# Patient Record
Sex: FEMALE | Race: WHITE | NOT HISPANIC OR LATINO | Employment: OTHER | URBAN - NONMETROPOLITAN AREA
[De-identification: names, ages, dates, MRNs, and addresses within clinical notes are randomized per-mention and may not be internally consistent; named-entity substitution may affect disease eponyms.]

---

## 2021-11-19 ENCOUNTER — OFFICE VISIT (OUTPATIENT)
Dept: CARDIOLOGY CLINIC | Facility: CLINIC | Age: 56
End: 2021-11-19

## 2021-11-19 VITALS
WEIGHT: 103 LBS | HEIGHT: 64 IN | BODY MASS INDEX: 17.58 KG/M2 | HEART RATE: 89 BPM | OXYGEN SATURATION: 97 % | DIASTOLIC BLOOD PRESSURE: 82 MMHG | SYSTOLIC BLOOD PRESSURE: 130 MMHG

## 2021-11-19 DIAGNOSIS — R00.0 TACHYCARDIA: Primary | ICD-10-CM

## 2021-11-19 DIAGNOSIS — G20 PARKINSON DISEASE (HCC): ICD-10-CM

## 2021-11-19 PROCEDURE — 99204 OFFICE O/P NEW MOD 45 MIN: CPT | Performed by: INTERNAL MEDICINE

## 2021-11-19 RX ORDER — IBUPROFEN 400 MG/1
400 TABLET ORAL
COMMUNITY

## 2021-11-21 PROBLEM — G20 PARKINSON DISEASE (HCC): Status: ACTIVE | Noted: 2021-11-21

## 2021-11-21 PROBLEM — R00.0 TACHYCARDIA: Status: ACTIVE | Noted: 2021-11-21

## 2021-11-21 PROBLEM — G20.A1 PARKINSON DISEASE: Status: ACTIVE | Noted: 2021-11-21

## 2021-11-21 PROCEDURE — 93000 ELECTROCARDIOGRAM COMPLETE: CPT | Performed by: INTERNAL MEDICINE

## 2021-12-08 ENCOUNTER — HOSPITAL ENCOUNTER (OUTPATIENT)
Dept: NON INVASIVE DIAGNOSTICS | Facility: HOSPITAL | Age: 56
Discharge: HOME | End: 2021-12-08
Attending: INTERNAL MEDICINE
Payer: COMMERCIAL

## 2021-12-08 VITALS
HEART RATE: 75 BPM | HEIGHT: 64 IN | DIASTOLIC BLOOD PRESSURE: 82 MMHG | WEIGHT: 105 LBS | BODY MASS INDEX: 17.93 KG/M2 | SYSTOLIC BLOOD PRESSURE: 130 MMHG

## 2021-12-08 DIAGNOSIS — R00.0 TACHYCARDIA: ICD-10-CM

## 2021-12-08 DIAGNOSIS — G20 PARKINSON DISEASE (HCC): ICD-10-CM

## 2021-12-08 LAB
AORTIC ROOT: 2.4 CM
APICAL FOUR CHAMBER EJECTION FRACTION: 78 %
E WAVE DECELERATION TIME: 232 MS
FRACTIONAL SHORTENING: 39 % (ref 28–44)
INTERVENTRICULAR SEPTUM IN DIASTOLE (PARASTERNAL SHORT AXIS VIEW): 0.7 CM
LEFT INTERNAL DIMENSION IN SYSTOLE: 2 CM (ref 2.1–4)
LEFT VENTRICULAR INTERNAL DIMENSION IN DIASTOLE: 3.3 CM (ref 3.6–5.36)
LEFT VENTRICULAR POSTERIOR WALL IN END DIASTOLE: 0.7 CM
LEFT VENTRICULAR STROKE VOLUME: 31 ML
MV E'TISSUE VEL-LAT: 14 CM/S
MV E'TISSUE VEL-SEP: 9 CM/S
MV PEAK A VEL: 0.59 M/S
MV PEAK E VEL: 77 CM/S
MV STENOSIS PRESSURE HALF TIME: 0 MS
SL CV LV EF: 70
SL CV PED ECHO LEFT VENTRICLE DIASTOLIC VOLUME (MOD BIPLANE) 2D: 43 ML
SL CV PED ECHO LEFT VENTRICLE SYSTOLIC VOLUME (MOD BIPLANE) 2D: 12 ML
TRICUSPID VALVE S': 0.8 CM/S
Z-SCORE OF LEFT VENTRICULAR DIMENSION IN END SYSTOLE: -2.86

## 2021-12-08 PROCEDURE — 93306 TTE W/DOPPLER COMPLETE: CPT

## 2021-12-08 PROCEDURE — 93306 TTE W/DOPPLER COMPLETE: CPT | Performed by: INTERNAL MEDICINE

## 2021-12-10 ENCOUNTER — TELEPHONE (OUTPATIENT)
Dept: CARDIOLOGY CLINIC | Facility: CLINIC | Age: 56
End: 2021-12-10

## 2022-04-06 ENCOUNTER — EVALUATION (OUTPATIENT)
Dept: PHYSICAL THERAPY | Facility: CLINIC | Age: 57
End: 2022-04-06

## 2022-04-06 DIAGNOSIS — R27.8 LIMB INCOORDINATION: ICD-10-CM

## 2022-04-06 DIAGNOSIS — G20 PRIMARY PARKINSONISM (HCC): Primary | ICD-10-CM

## 2022-04-06 PROCEDURE — 97161 PT EVAL LOW COMPLEX 20 MIN: CPT | Performed by: PHYSICAL THERAPIST

## 2022-04-06 NOTE — PROGRESS NOTES
PT Evaluation     Today's date: 2022  Patient name: Diane Fairbanks  : 1965  MRN: [id number]  Referring provider: Neo Barrios*  Dx:   Encounter Diagnosis     ICD-10-CM    1  Primary parkinsonism ([address] )  G20    2  Limb incoordination  R27 8        Start Time: 1535  Stop Time: 1630  Total time in clinic (min): 55 minutes    Assessment  Assessment details: 61year old female diagnosed with Parkinsons Disease 4 months ago  Pt has had hand sx's of weakness and incoordination for 2 years  Pt very concerned about the progression of weakness and decreasing function in her hands, specifically the dominant R  Increased tone in R UE elbow flx/ext, and wrist/finger flexor groups   testing setting #2: R/43#, L/51#  Significant incoordination R hand compared to L: 9 hole peg: R/38", L/24"  DASH 48% disability  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  She is doing multiple sessions of exercise in various forms daily  She would benefit from a short course of PT at this time to instruct in sensory integration techniques, and strengthening/coordination activity for hands  Patient was issued foam tubing to increase width if  in pens and eating utensils  Impairments: abnormal or restricted ROM, activity intolerance, impaired physical strength, lacks appropriate home exercise program and pain with function  Functional limitations: using utensils; cooking- difficulty chopping, peeling, open packaging; writing; takes a long time to dress  Symptom irritability: moderateUnderstanding of Dx/Px/POC: excellent   Prognosis: fair  Prognosis details: Progressive neurologic disease process    Goals  STG- 3 weeks  22  1  I HEP  2  Improve understanding of sensory integration techniques  3  Review adaptive equipment for improving daily function    LTG- 6 weeks  1  Improve speed of 9 hole peg test R hand by 5-8sec  2   Improve  R hand 5-10# in order to increase daily functional activity level  3  Improve DASH score to under 25% disability    Plan  Patient would benefit from: PT eval, custom splinting and skilled physical therapy  Referral necessary: No  Planned therapy interventions: motor coordination training, neuromuscular re-education, therapeutic activities, therapeutic exercise and home exercise program  Frequency: 1x week  Duration in visits: 6  Duration in weeks: 6  Plan of Care beginning date: 2022  Plan of Care expiration date: 2022  Treatment plan discussed with: patient        Subjective Evaluation    History of Present Illness  Date of onset: 4/15/2020  Mechanism of injury: Pt dx with Parkinsons in   Pt states she has had difficulties with hands for about 2 years  Pt has had CTS ruled out  Pt reports intermittent numbness and paresthesias R hand  Pt reports B neck pain with eating  Notes intention tremor            Recurrent probem    Quality of life: excellent    Pain  Current pain ratin  At best pain ratin  At worst pain ratin  Quality: needle-like  Aggravating factors: eating    Social Support  Steps to enter house: yes  2  Stairs in house: yes   13  Lives in: one-story house  Lives with: young children and spouse    Employment status: working (chiropractor, online business, herbal/natural meds)  Hand dominance: right      Diagnostic Tests  MRI studies: abnormal  CT scan: abnormal  Treatments  Current treatment: physical therapy  Patient Goals  Patient goals for therapy: independence with ADLs/IADLs  Patient goal: exercise; use computer        Objective     Observations     Additional Observation Details  Limited motion of UE's during tranfers and gaiting    Neurological Testing     Additional Neurological Details  Diminished coordination- rapid opposition thumb to finger tips: R/23x, L/28x    Finger to nose intact L/R    + Disdiadokinesia R    Diminished coordination B LE heel to shin L/R    Active Range of Motion Left Shoulder   Flexion: 135 degrees   Abduction: 105 degrees     Right Shoulder   Flexion: 135 degrees   Extension: 135 degrees     Strength/Myotome Testing     Left Shoulder     Planes of Motion   Flexion: 4   Abduction: 5     Right Shoulder     Planes of Motion   Flexion: 4-   Abduction: 5     Left Elbow   Flexion: 5  Extension: 5    Right Elbow   Flexion: 4+  Extension: 4    Left Wrist/Hand   Wrist extension: 5  Wrist flexion: 5     (2nd hand position)     Trial 1: 43    Trial 2: 54    Thumb Strength  Key/Lateral Pinch     Trial 1: 13  Palmar/Three-Point Pinch     Trial 1: 12    Right Wrist/Hand   Wrist extension: 4+  Wrist flexion: 4     (2nd hand position)     Trial 1: 51    Trial 2: 48    Thumb Strength   Key/Lateral Pinch     Trial 1: 16  Palmar/Three-Point Pinch     Trial 1: 12    Tests     Additional Tests Details        Ambulation   Weight-Bearing Status   Weight-Bearing Status (Left): full weight bearing   Weight-Bearing Status (Right): full weight-bearing    Assistive device used: none    Additional Weight-Bearing Status Details  No shoulder motion or arm swing with ambulation    Ambulation: Level Surfaces   Ambulation without assistive device: independent    Ambulation: Stairs   Ascend stairs: independent  Pattern: reciprocal  Railings: one rail  Descend stairs: independent  Pattern: reciprocal  Railings: one rail    General Comments:      Wrist/Hand Comments  DASH -48% disability  Neuro Exam:     Sensation   Light touch LE: left WNL and right WNL    Functional outcomes   5x sit to stand: 14 (seconds)  TU (seconds)  Left 9 peg hole test: 24 (seconds)  Right 9 hole peg test: 37 (seconds)  Functional outcome comment: FGA  24/30      Balance assessments   MCTSIB   Eyes open level surface: 30  Eyes open foam surface: 30  Eyes closed level surface: 30  Eyes closed foam surface: 30 severe sway      Flowsheet Rows    Flowsheet Row Most Recent Value   PT/OT G-Codes    Current Score 54   Projected Score 67   Assessment Type Evaluation             Precautions: Parkinsons, fall risk      Manuals 4/6            Santos stretch fingers R hand NV                                                   Neuro Re-Ed             Texture desensitization Towel 3 min            Particle desensitization NV            Instructed in use of foam tubing to increase diameter of pen and eating utensils CRR            Weight shift WB hands on foam NV                                                   Ther Ex             scap elevate/retract NV            recip arms NV            B shoulder/elbow tbands flx/ext NV                                                                             Ther Activity             Putty   pinch  spread NV                                                                Modalities

## 2022-04-06 NOTE — LETTER
2022    Mindi Buchanan MD  [facility]  [address] [facility]    Patient: Shukri Foley   YOB: 1965   Date of Visit: 2022     Encounter Diagnosis     ICD-10-CM    1  Primary parkinsonism ([facility] [address] )  G20    2  Limb incoordination  R27 8        Dear Dr Robbie Nicole: Thank you for your recent referral of Shukri Foley  Please review the attached evaluation summary from Rita's recent visit  Please verify that you agree with the plan of care by signing the attached order  If you have any questions or concerns, please do not hesitate to call  I sincerely appreciate the opportunity to share in the care of one of your patients and hope to have another opportunity to work with you in the near future  Sincerely,    Juan Pablo Salguero, PT      Referring Provider:      I certify that I have read the below Plan of Care and certify the need for these services furnished under this plan of treatment while under my care  Mindi Buchanan MD  [address]  Via Fax: [phone]          PT Evaluation     Today's date: 2022  Patient name: Shukri Foley  : 1965  MRN: [id number]  Referring provider: Corrine Virk*  Dx:   Encounter Diagnosis     ICD-10-CM    1  Primary parkinsonism ([facility] [address] )  G20    2  Limb incoordination  R27 8        Start Time: 1535  Stop Time: 1630  Total time in clinic (min): 55 minutes    Assessment  Assessment details: 61year old female diagnosed with Parkinsons Disease 4 months ago  Pt has had hand sx's of weakness and incoordination for 2 years  Pt very concerned about the progression of weakness and decreasing function in her hands, specifically the dominant R  Increased tone in R UE elbow flx/ext, and wrist/finger flexor groups   testing setting #2: R/43#, L/51#    Significant incoordination R hand compared to L: 9 hole peg: R/38", L/24"  DASH 48% disability  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  She is doing multiple sessions of exercise in various forms daily  She would benefit from a short course of PT at this time to instruct in sensory integration techniques, and strengthening/coordination activity for hands  Patient was issued foam tubing to increase width if  in pens and eating utensils  Impairments: abnormal or restricted ROM, activity intolerance, impaired physical strength, lacks appropriate home exercise program and pain with function  Functional limitations: using utensils; cooking- difficulty chopping, peeling, open packaging; writing; takes a long time to dress  Symptom irritability: moderateUnderstanding of Dx/Px/POC: excellent   Prognosis: fair  Prognosis details: Progressive neurologic disease process    Goals  STG- 3 weeks  4/27/22  1  I HEP  2  Improve understanding of sensory integration techniques  3  Review adaptive equipment for improving daily function    LTG- 6 weeks  1  Improve speed of 9 hole peg test R hand by 5-8sec  2  Improve  R hand 5-10# in order to increase daily functional activity level  3  Improve DASH score to under 25% disability    Plan  Patient would benefit from: PT eval, custom splinting and skilled physical therapy  Referral necessary: No  Planned therapy interventions: motor coordination training, neuromuscular re-education, therapeutic activities, therapeutic exercise and home exercise program  Frequency: 1x week  Duration in visits: 6  Duration in weeks: 6  Plan of Care beginning date: 4/6/2022  Plan of Care expiration date: 5/25/2022  Treatment plan discussed with: patient        Subjective Evaluation    History of Present Illness  Date of onset: 4/15/2020  Mechanism of injury: Pt dx with Parkinsons in 11/21  Pt states she has had difficulties with hands for about 2 years  Pt has had CTS ruled out    Pt reports intermittent numbness and paresthesias R hand  Pt reports B neck pain with eating  Notes intention tremor            Recurrent probem    Quality of life: excellent    Pain  Current pain ratin  At best pain ratin  At worst pain ratin  Quality: needle-like  Aggravating factors: eating    Social Support  Steps to enter house: yes  2  Stairs in house: yes   13  Lives in: one-story house  Lives with: young children and spouse    Employment status: working (chiropractor, online business, herbal/natural meds)  Hand dominance: right      Diagnostic Tests  MRI studies: abnormal  CT scan: abnormal  Treatments  Current treatment: physical therapy  Patient Goals  Patient goals for therapy: independence with ADLs/IADLs  Patient goal: exercise; use computer        Objective     Observations     Additional Observation Details  Limited motion of UE's during tranfers and gaiting    Neurological Testing     Additional Neurological Details  Diminished coordination- rapid opposition thumb to finger tips: R/23x, L/28x    Finger to nose intact L/R    + Disdiadokinesia R    Diminished coordination B LE heel to shin L/R    Active Range of Motion   Left Shoulder   Flexion: 135 degrees   Abduction: 105 degrees     Right Shoulder   Flexion: 135 degrees   Extension: 135 degrees     Strength/Myotome Testing     Left Shoulder     Planes of Motion   Flexion: 4   Abduction: 5     Right Shoulder     Planes of Motion   Flexion: 4-   Abduction: 5     Left Elbow   Flexion: 5  Extension: 5    Right Elbow   Flexion: 4+  Extension: 4    Left Wrist/Hand   Wrist extension: 5  Wrist flexion: 5     (2nd hand position)     Trial 1: 43    Trial 2: 54    Thumb Strength  Key/Lateral Pinch     Trial 1: 13  Palmar/Three-Point Pinch     Trial 1: 12    Right Wrist/Hand   Wrist extension: 4+  Wrist flexion: 4     (2nd hand position)     Trial 1: 51    Trial 2: 48    Thumb Strength   Key/Lateral Pinch     Trial 1: 16  Palmar/Three-Point Pinch     Trial 1: 12    Tests     Additional Tests Details        Ambulation   Weight-Bearing Status   Weight-Bearing Status (Left): full weight bearing   Weight-Bearing Status (Right): full weight-bearing    Assistive device used: none    Additional Weight-Bearing Status Details  No shoulder motion or arm swing with ambulation    Ambulation: Level Surfaces   Ambulation without assistive device: independent    Ambulation: Stairs   Ascend stairs: independent  Pattern: reciprocal  Railings: one rail  Descend stairs: independent  Pattern: reciprocal  Railings: one rail    General Comments:      Wrist/Hand Comments  DASH -48% disability  Neuro Exam:     Sensation   Light touch LE: left WNL and right WNL    Functional outcomes   5x sit to stand: 14 (seconds)  TU (seconds)  Left 9 peg hole test: 24 (seconds)  Right 9 hole peg test: 37 (seconds)  Functional outcome comment: FGA  24/30      Balance assessments   MCTSIB   Eyes open level surface: 30  Eyes open foam surface: 30  Eyes closed level surface: 30  Eyes closed foam surface: 30 severe sway      Flowsheet Rows      Most Recent Value   PT/OT G-Codes    Current Score 54   Projected Score 67   Assessment Type Evaluation             Precautions: Parkinsons, fall risk      Manuals 4/6            Santos stretch fingers R hand NV                                                   Neuro Re-Ed             Texture desensitization Towel 3 min            Particle desensitization NV            Instructed in use of foam tubing to increase diameter of pen and eating utensils CRR            Weight shift WB hands on foam NV                                                   Ther Ex             scap elevate/retract NV            recip arms NV            B shoulder/elbow tbands flx/ext NV                                                                             Ther Activity             Putty   pinch  spread NV Modalities

## 2022-04-13 ENCOUNTER — OFFICE VISIT (OUTPATIENT)
Dept: PHYSICAL THERAPY | Facility: CLINIC | Age: 57
End: 2022-04-13

## 2022-04-13 DIAGNOSIS — R27.8 LIMB INCOORDINATION: ICD-10-CM

## 2022-04-13 DIAGNOSIS — G20 PRIMARY PARKINSONISM (HCC): Primary | ICD-10-CM

## 2022-04-13 PROCEDURE — 97112 NEUROMUSCULAR REEDUCATION: CPT | Performed by: PHYSICAL THERAPIST

## 2022-04-13 NOTE — PROGRESS NOTES
Daily Note     Today's date: 2022  Patient name: Poncho Anand  : 1965  MRN: [id number]  Referring provider: Morales Hendrickson*  Dx:   Encounter Diagnosis     ICD-10-CM    1  Primary parkinsonism ([address] )  G20    2  Limb incoordination  R27 8        Start Time: 1430  Stop Time: 1500  Total time in clinic (min): 30 minutes    Subjective: Pt states mild paresthesias B hands today  Objective: See treatment diary below      Assessment: Pt using foam build up for writing, did not work well for eating utensils  Continued with desensitizing, sensory integration activity, progressed with particle desensitize and WB through hands  Initiated strengthening with putty for  and pinch, and coordination activity  Discussed BIG therapy and encouraged patient to investigate it  Continue PT 1x/week  With goal of improving use b hands  Plan: Progress treatment as tolerated         Precautions: Parkinsons, fall risk      Manuals            Santos stretch fingers R hand NV -                                                  Neuro Re-Ed             Texture desensitization Towel 3 min 5'           Particle desensitization NV Rice 5'           Instructed in use of foam tubing to increase diameter of pen and eating utensils CRR -           Weight shift WB hands on foam NV 2' on pillow                                                  Ther Ex             scap elevate/retract NV -           recip arms NV -           B shoulder/elbow tbands flx/ext NV -                                                                            Ther Activity             Putty   pinch  spread NV  1 min  Pinch thumb to all 10x           Ball on fist  circles  CC/C  10x, seated, standing, and walking                                                  Modalities

## 2022-04-20 ENCOUNTER — APPOINTMENT (OUTPATIENT)
Dept: PHYSICAL THERAPY | Facility: CLINIC | Age: 57
End: 2022-04-20

## 2022-04-27 ENCOUNTER — APPOINTMENT (OUTPATIENT)
Dept: PHYSICAL THERAPY | Facility: CLINIC | Age: 57
End: 2022-04-27

## 2022-04-28 ENCOUNTER — OFFICE VISIT (OUTPATIENT)
Dept: PHYSICAL THERAPY | Facility: CLINIC | Age: 57
End: 2022-04-28

## 2022-04-28 DIAGNOSIS — G20 PRIMARY PARKINSONISM (HCC): ICD-10-CM

## 2022-04-28 DIAGNOSIS — R27.8 LIMB INCOORDINATION: Primary | ICD-10-CM

## 2022-04-28 PROCEDURE — 97110 THERAPEUTIC EXERCISES: CPT | Performed by: PHYSICAL THERAPIST

## 2022-04-28 NOTE — PROGRESS NOTES
Daily Note     Today's date: 2022  Patient name: Mattie Em  : 1965  MRN: [id number]  Referring provider: Waleska Patel*  Dx:   Encounter Diagnosis     ICD-10-CM    1  Limb incoordination  R27 8    2  Primary parkinsonism ([address] )  Cristy Foster        Start Time: 1140  Stop Time: 1210  Total time in clinic (min): 30 minutes    Subjective: Pt noting less paresthesias with sensory re-ed techniques  C/O periodic neck pain      Objective: See treatment diary below      Assessment:  Pt plans to return for further PT for instruction in large amplitude movement techniques to incorporate in to her exercise regime  Instructed in stretching program for extrinsic, intrinsic finger flexors and extensors  R hand extrinsic extensors with more tone than L  Continue PT with goal of establishing comprehensive HEP for Parkinsons program       Plan: Progress treatment as tolerated         Precautions: Parkinsons, fall risk      Manuals           Santos stretch fingers R hand NV - PROM/stretch B hands, intrinsics, extrinsics                                                 Neuro Re-Ed             Texture desensitization Towel 3 min 5' 5' L/R          Particle desensitization NV Rice 5' -          Instructed in use of foam tubing to increase diameter of pen and eating utensils CRR -           Weight shift WB hands on foam NV 2' on pillow reviewed          Discussion of pros and cons of "BIG" program   AE                                    Ther Ex             scap elevate/retract NV -           recip arms NV -           B shoulder/elbow tbands flx/ext NV -           Prayer stretch  Tented prayer stretch   2 x 30"  2 x 30"            Self stretch finger ext extrinsics   Wrist flx with fist  2 x 30"          Seated wrist ext stretch   reach behind, supinated  2 x 30"          Intrinsic stretch   2 x 30"                       Ther Activity             Putty   pinch  spread NV  1 min  Pinch thumb to all 10x HEP          Ball on fist  circles  CC/C  10x, seated, standing, and walking HEP                                                 Modalities

## 2022-04-28 NOTE — PROGRESS NOTES
Daily Note     Today's date: 2022  Patient name: Joel Remy  : 1965  MRN: [id number]  Referring provider: Christin Bradford*  Dx:   Encounter Diagnosis     ICD-10-CM    1  Limb incoordination  R27 8    2  Primary parkinsonism ([facility] [address] )  G20                   Subjective: Some neck pain  Diminished paresthesias noted with sensory ed program         Objective: See treatment diary below      Assessment:     Plan: Progress treatment as tolerated         Precautions: Parkinsons, fall risk      Manuals           Santos stretch fingers R hand NV -                                                  Neuro Re-Ed             Texture desensitization Towel 3 min 5'           Particle desensitization NV Rice 5'           Instructed in use of foam tubing to increase diameter of pen and eating utensils CRR -           Weight shift WB hands on foam NV 2' on pillow                                                  Ther Ex             scap elevate/retract NV -           recip arms NV -           B shoulder/elbow tbands flx/ext NV -                                                                            Ther Activity             Putty   pinch  spread NV  1 min  Pinch thumb to all 10x           Ball on fist  circles  CC/C  10x, seated, standing, and walking                                                  Modalities

## 2022-05-17 ENCOUNTER — APPOINTMENT (OUTPATIENT)
Dept: PHYSICAL THERAPY | Facility: CLINIC | Age: 57
End: 2022-05-17

## 2022-05-19 ENCOUNTER — OFFICE VISIT (OUTPATIENT)
Dept: CARDIOLOGY CLINIC | Facility: CLINIC | Age: 57
End: 2022-05-19

## 2022-05-19 VITALS
OXYGEN SATURATION: 98 % | WEIGHT: 106 LBS | DIASTOLIC BLOOD PRESSURE: 66 MMHG | HEART RATE: 75 BPM | HEIGHT: 64 IN | BODY MASS INDEX: 18.1 KG/M2 | SYSTOLIC BLOOD PRESSURE: 118 MMHG

## 2022-05-19 DIAGNOSIS — G20 PARKINSON DISEASE (HCC): ICD-10-CM

## 2022-05-19 DIAGNOSIS — R00.0 TACHYCARDIA: Primary | ICD-10-CM

## 2022-05-19 PROCEDURE — 99213 OFFICE O/P EST LOW 20 MIN: CPT | Performed by: NURSE PRACTITIONER

## 2022-05-19 RX ORDER — CHLORAL HYDRATE 500 MG
CAPSULE ORAL
COMMUNITY

## 2022-05-19 RX ORDER — MULTIVITAMIN WITH IRON
250 TABLET ORAL DAILY
COMMUNITY

## 2022-05-19 NOTE — ASSESSMENT & PLAN NOTE
Patient presented for evaluation of tachycardia in November 2021  She had recently been diagnosed with Parkinson's disease by her neurologist   She noted an overall increase in resting heart rate as well as episodes of heart pounding (hard and fast without irregularity)  Concerned about atrial fibrillation given family history of atrial fibrillation and stroke in her mom and dad  ECG 11/19/2021 showed normal sinus rhythm at 89 bpm    Dr Gilbert Rothman discussed possible [employer] monitoring however [address] device recommended for access to frequent monitoring  Echocardiogram 12/8/2021 with EF 70%  No wall motion or valvular abnormalities  Suspect she is at risk of autonomic dysfunction related to her underlying Parkinson's disease  She was instructed on increased hydration as well as daily magnesium supplementation with improvement in heart rates  Currently taking magnesium 250 mg daily  She does now have the [address] for home monitoring, but has not yet used it  Will monitor

## 2022-05-19 NOTE — PATIENT INSTRUCTIONS
Please let us know if you need any assistance with getting your [facility] activated  You can send an email with EKG's as needed  Please let us know if more frequent episodes of heart racing or any new or worsening symptoms

## 2022-05-19 NOTE — PROGRESS NOTES
Cardiology Follow Up    Jimy Goodwin  1965  [id number]  [facility]  [address]  [address]  [facility] [place] [id number] [phone]    Montserrat Abdalla presents for routine follow up of tachycardia  1  Tachycardia  Assessment & Plan:  Patient presented for evaluation of tachycardia in November 2021  She had recently been diagnosed with Parkinson's disease by her neurologist   She noted an overall increase in resting heart rate as well as episodes of heart pounding (hard and fast without irregularity)  Concerned about atrial fibrillation given family history of atrial fibrillation and stroke in her mom and dad  ECG 11/19/2021 showed normal sinus rhythm at 89 bpm    Dr Adrian Mota discussed possible ZIO monitoring however [address] device recommended for access to frequent monitoring  Echocardiogram 12/8/2021 with EF 70%  No wall motion or valvular abnormalities  Suspect she is at risk of autonomic dysfunction related to her underlying Parkinson's disease  She was instructed on increased hydration as well as daily magnesium supplementation with improvement in heart rates  Currently taking magnesium 250 mg daily  She does now have the [address] for home monitoring, but has not yet used it  Will monitor  2  Parkinson disease [facility])  Assessment & Plan:  Diagnosis made by [facility] neurologist  Following there for management  Not currently taking prescriptive medication  HPI  Page Macadamia has a past medical history of Parkinson's disease diagnosed 11/2021  She met in consultation with Dr Adrian Mota on 11/19/2021 for evaluation of tachycardia  She self referred due to family history of atrial fibrillation  She reported onset of heart pounding over the past few weeks, which she described as a hard/fast heart beat  She noticed her resting heart rate was increased, which had previously been low   She had been diagnosed with Parkinson's disease a few days prior to the appointment and noticed increased stress overall, especially in the setting of her recent diagnosis  She denied chest pain or shortness of breath  She denied overt palpitations  She had tremors  No prior cardiac work up  ECG at her visit showed NSR, possible right atrial enlargement  Nonspecific ST abnormality  Echocardiogram was ordered  She and Dr Frederick Grimaldo discussed [employer] monitoring, but opted for [facility] monitoring  She was instructed to start a magnesium supplement and increase hydration  Echocardiogram completed 12/8/2021 showed EF 70% with no wall motion or valvular abnormalities  5/19/2022Julia Bob presents today for routine follow up  She tells me that she recently started Mucuna pruriens supplement recommended by her herbalist which is helping her Parkinson's symptoms but especially mood  She continues to follow with [place] neurology, Dr Dianne Stevens, and was seen most recently on 4/19/22  She was prescribed Azilect which she has not yet started due to concerns for OCD/hallucinations  She is taking magnesium 250 mg daily and reports improvement in her heart rates  She remains very active, participating in physical therapy, yoga, boxing, ping-pong, and cycling  She denies any chest discomfort, shortness of breath/dyspnea on exertion  She finds it difficult to get her heart rate elevated above 90 bpm with exercise  She denies overt palpitations  No lightheadedness  No swelling/edema       Medical Problems     Problem List     Tachycardia    Parkinson disease [facility])        Past Medical History:   Diagnosis Date    [facility]      Social History     Socioeconomic History    Marital status: /Civil Union     Spouse name: Not on file    Number of children: 2    Years of education: Not on file    Highest education level: Not on file   Occupational History    Occupation: chiropractor   Tobacco Use    Smoking status: Never Smoker    Smokeless tobacco: Current User   Vaping Use    Vaping Use: Never used   Substance and Sexual Activity    Alcohol use: Not Currently    Drug use: Never    Sexual activity: Yes     Partners: Male   Other Topics Concern    Not on file   Social History Narrative    Not on file     Social Determinants of Health     Financial Resource Strain: Not on file   Food Insecurity: Not on file   Transportation Needs: Not on file   Physical Activity: Not on file   Stress: Not on file   Social Connections: Not on file   Intimate Partner Violence: Not on file   Housing Stability: Not on file      Family History   Problem Relation Age of Onset    Atrial fibrillation Mother     Stroke Mother     Heart failure Father     Atrial fibrillation Father     Pulmonary embolism Father     Hypertension Father     Hyperlipidemia Father     Abnormal EKG Brother     Hypertrophic cardiomyopathy Brother      Past Surgical History:   Procedure Laterality Date    WISDOM TOOTH EXTRACTION         Current Outpatient Medications:     acetaminophen (TYLENOL) 500 mg tablet, Take 1,000 mg by mouth 2-3 times per week, Disp: , Rfl:     Cetirizine HCl (ZyrTEC Allergy) 10 MG CAPS, Take 10 mg by mouth, Disp: , Rfl:     Cholecalciferol 125 MCG (5000 UT) capsule, Take 5,000 Units by mouth daily, Disp: , Rfl:     ibuprofen (MOTRIN) 400 mg tablet, Take 400 mg by mouth 2-3 days per week, Disp: , Rfl:     Magnesium 250 MG TABS, Take 250 mg by mouth in the morning, Disp: , Rfl:     Omega-3 1000 MG CAPS, Take by mouth, Disp: , Rfl:     TURMERIC PO, Take 600 mg by mouth in the morning, Disp: , Rfl:     rasagiline (AZILECT) 1 MG, Take 1 mg by mouth in the morning   (Patient not taking: Reported on 5/19/2022), Disp: , Rfl:   Allergies   Allergen Reactions    Other Hives, Shortness Of Breath and Itching     Ragweed    Shellfish Allergy - Food Allergy Other (See Comments)     Family history of allergy    Azithromycin Other (See Comments)    Penicillins Other (See Comments)     Cardiac Test Results:     ECG 11/19/2021: Normal sinus rhythm  Possible right atrial enlargement  Non specific ST abnormality  No old for comparison       Lipid panel 6/3/2021: C 241  T 71  H 76  L 151  Review of Systems   Constitutional: Positive for malaise/fatigue (improving)  HENT: Negative  Cardiovascular: Negative for chest pain, dyspnea on exertion, irregular heartbeat, leg swelling, near-syncope, orthopnea and palpitations  Elevated heart rates are improved   Respiratory: Negative for cough, shortness of breath and snoring  Endocrine: Negative  Skin: Negative  Musculoskeletal: Positive for muscle weakness, myalgias and stiffness  Gastrointestinal: Negative  Genitourinary: Negative  Neurological: Positive for disturbances in coordination, loss of balance and tremors  Psychiatric/Behavioral: Negative  Vitals:    05/19/22 0932   BP: 118/66   Pulse: 75   SpO2: 98%     Vitals:    05/19/22 0932   Weight: 48 1 kg (106 lb)     Height: 5' 4" (162 6 cm)   Body mass index is 18 19 kg/m²  Physical Exam  Vitals and nursing note reviewed  Constitutional:       General: She is not in acute distress  Appearance: She is well-developed  She is not diaphoretic  HENT:      Head: Normocephalic and atraumatic  Neck:      Thyroid: No thyromegaly  Vascular: No carotid bruit or JVD  Cardiovascular:      Rate and Rhythm: Normal rate and regular rhythm  No extrasystoles are present  Pulses: Intact distal pulses  Radial pulses are 2+ on the right side and 2+ on the left side  Heart sounds: Normal heart sounds, S1 normal and S2 normal  No murmur heard  Comments: No lower extremity edema  Pulmonary:      Effort: Pulmonary effort is normal       Breath sounds: Normal breath sounds  Abdominal:      General: There is no distension  Palpations: Abdomen is soft  Tenderness: There is no abdominal tenderness     Musculoskeletal: General: Normal range of motion  Cervical back: Normal range of motion and neck supple  Lymphadenopathy:      Cervical: No cervical adenopathy  Skin:     General: Skin is warm and dry  Neurological:      Mental Status: She is alert and oriented to person, place, and time  Cranial Nerves: No cranial nerve deficit  Motor: Tremor and abnormal muscle tone present  Psychiatric:         Mood and Affect: Mood and affect normal          Speech: Speech normal          Behavior: Behavior normal  Behavior is cooperative           Cognition and Memory: Cognition and memory normal

## 2022-05-19 NOTE — ASSESSMENT & PLAN NOTE
Diagnosis made by [facility] neurologist  Following there for management  Not currently taking prescriptive medication

## 2022-05-23 NOTE — PROGRESS NOTES
Daily Note     Today's date: 2022  Patient name: Angela Hill  : 1965  MRN: [id number]  Referring provider: Ye Velazquez*  Dx:   Encounter Diagnosis     ICD-10-CM    1  Limb incoordination  R27 8    2  Primary parkinsonism ([facility] [facility] )  Roberto Martinez        Start Time: 1445  Stop Time: 1520  Total time in clinic (min): 35 minutes    Subjective:  pt reports she tries to stay active with rock steady boxing program, yoga, weight lifting, walking and ping pong  Does c/o slowness/bradykinesia and some freezing of mov't as well as some balance issues and fatigue  Looking for adjunct program to help her  Objective: See treatment diary below      Assessment:  Discussion/education about big effort/amplitude of mov't to be exhibited during exercises/mov't  Pt performed daily maximal exercises with verbal and tactile cues for technique and completed session exhibiting good technique with the exercises  Pt does not wish to pursue the complete LSVTBig 4 week program at this time  Plan: pt wishes to try on her own and not participate in full 4 week LSVT Big program   Will hold chart open 2 more weeks for pt to reconsider  Will d/c at that time if pt doesn't contact this office  Precautions: Parkinsons, fall risk      1-10 Calibration Feel/Effort   22             #4            Neuro Re-Ed             Daily Maximal Exercises:             Sustained: (10 count)  1  Sitting Floor to Ceiling- 8 reps  2  Sitting side to side-8 reps ea side       x8    x8            Repetitive:  3  Stand Forward     step/reach-8 each side       x8            4  Stand Sideways step/reach-8 each side x8            5  Stand Backwards step/reach-8 each side     x8            6  Stand  Forward rock and reach-10 each side, up to 20     x10            7  Stand Sideways rock and reach-10 each side, up to 20   x10            Functional Component Tasks: all 5 reps  1   Sit to stand BIG, 5 reps     x5            2  3              4              5              Walking BIG             Distance, surface, time   Arm swing, step length, head position             Hierarchy Tasks (cueing, modeling, assistance,pt effort, progression)             #1             #2             #3             HW/Carryover Assignment

## 2022-05-24 ENCOUNTER — OFFICE VISIT (OUTPATIENT)
Dept: PHYSICAL THERAPY | Facility: CLINIC | Age: 57
End: 2022-05-24

## 2022-05-24 DIAGNOSIS — G20 PRIMARY PARKINSONISM (HCC): ICD-10-CM

## 2022-05-24 DIAGNOSIS — R27.8 LIMB INCOORDINATION: Primary | ICD-10-CM

## 2022-05-24 PROCEDURE — 97112 NEUROMUSCULAR REEDUCATION: CPT

## 2023-07-06 RX ORDER — PNV NO.95/FERROUS FUM/FOLIC AC 28MG-0.8MG
TABLET ORAL DAILY
COMMUNITY
End: 2023-07-10 | Stop reason: SDUPTHER

## 2023-07-06 RX ORDER — MULTIVITAMIN WITH IRON
250 TABLET ORAL
COMMUNITY
End: 2023-07-10 | Stop reason: SDUPTHER

## 2023-07-10 ENCOUNTER — OFFICE VISIT (OUTPATIENT)
Dept: CARDIOLOGY CLINIC | Facility: CLINIC | Age: 58
End: 2023-07-10

## 2023-07-10 VITALS
WEIGHT: 105.4 LBS | TEMPERATURE: 98 F | BODY MASS INDEX: 17.99 KG/M2 | HEART RATE: 66 BPM | DIASTOLIC BLOOD PRESSURE: 82 MMHG | HEIGHT: 64 IN | SYSTOLIC BLOOD PRESSURE: 102 MMHG | OXYGEN SATURATION: 98 %

## 2023-07-10 DIAGNOSIS — R00.0 TACHYCARDIA: Primary | ICD-10-CM

## 2023-07-10 DIAGNOSIS — G20 PARKINSON DISEASE (HCC): ICD-10-CM

## 2023-07-10 DIAGNOSIS — E78.2 MIXED HYPERLIPIDEMIA: ICD-10-CM

## 2023-07-10 PROCEDURE — 93000 ELECTROCARDIOGRAM COMPLETE: CPT | Performed by: NURSE PRACTITIONER

## 2023-07-10 PROCEDURE — 99214 OFFICE O/P EST MOD 30 MIN: CPT | Performed by: NURSE PRACTITIONER

## 2023-07-10 NOTE — ASSESSMENT & PLAN NOTE
Diagnosis made by [facility] neurologist Dr. Manjinder Lundberg. Now following with [facility] neurologist Dr. José Luis Li. Recently started rasagiline 1 mg daily along with Macuna Beans.

## 2023-07-10 NOTE — ASSESSMENT & PLAN NOTE
Lipid panel 2/10/2023: C 336. T 48. H 80. L 246. Direct . Not currently maintained on statin therapy. She has since made dietary modifications. Has plans for repeat lipid panel fall 2023. Will watch for results. We reviewed benefits of dietary modification with avoiding saturated fats. Continue regular exercise.

## 2023-07-10 NOTE — ASSESSMENT & PLAN NOTE
Patient presented for evaluation of tachycardia in November 2021. She had recently been diagnosed with Parkinson's disease by her neurologist.  She noted an overall increase in resting heart rate as well as episodes of heart pounding (hard and fast without irregularity). Concerned about atrial fibrillation given family history of atrial fibrillation and stroke in her mom and dad. ECG 11/19/2021 showed normal sinus rhythm at 89 bpm.   Dr. Reggie Hendricks discussed possible ZIO monitoring however [employer] device recommended for access to frequent monitoring. Echocardiogram 12/8/2021 with EF 70%. No wall motion or valvular abnormalities. Suspect she is at risk of autonomic dysfunction related to her underlying Parkinson's disease. She was instructed on increased hydration as well as daily magnesium supplementation with improvement in heart rates. Currently taking magnesium 250 mg daily. She does now have the [employer] for home monitoring, but has not yet used it. Will monitor.

## 2023-07-10 NOTE — PROGRESS NOTES
Cardiology Follow Up    Joshua Medina  1965  [id number]  [facility]  [address]  [address]  [facility] [place] [id number] [phone]    Nadira Moreno presents for routine follow up of tachycardia    1. Tachycardia  Assessment & Plan:  Patient presented for evaluation of tachycardia in November 2021. She had recently been diagnosed with Parkinson's disease by her neurologist.  She noted an overall increase in resting heart rate as well as episodes of heart pounding (hard and fast without irregularity). Concerned about atrial fibrillation given family history of atrial fibrillation and stroke in her mom and dad. ECG 11/19/2021 showed normal sinus rhythm at 89 bpm.   Dr. Carole Cole discussed possible ZIO monitoring however [address] device recommended for access to frequent monitoring. Echocardiogram 12/8/2021 with EF 70%. No wall motion or valvular abnormalities. Suspect she is at risk of autonomic dysfunction related to her underlying Parkinson's disease. She was instructed on increased hydration as well as daily magnesium supplementation with improvement in heart rates. Currently taking magnesium 250 mg daily. She does now have the [address] for home monitoring, but has not yet used it. Will monitor. Orders:  -     POCT ECG    2. Mixed hyperlipidemia  Assessment & Plan:  Lipid panel 2/10/2023: C 336. T 48. H 80. L 246. Direct . Not currently maintained on statin therapy. She has since made dietary modifications. Has plans for repeat lipid panel fall 2023. Will watch for results. We reviewed benefits of dietary modification with avoiding saturated fats. Continue regular exercise. 3. Parkinson disease [facility])  Assessment & Plan:  [facility]. Now following with [facility] neurologist Dr. Zac Sharma. Recently started rasagiline 1 mg daily along with Macuna Beans.          HPI  Nadira Moreno has a past medical history of Parkinson's disease diagnosed 11/2021.     She met in consultation with Dr. Tomás Escudero on 11/19/2021 for evaluation of tachycardia. She self referred due to family history of atrial fibrillation. She reported onset of heart pounding over the past few weeks, which she described as a hard/fast heart beat. She noticed her resting heart rate was increased, which had previously been low. She had been diagnosed with Parkinson's disease a few days prior to the appointment and noticed increased stress overall, especially in the setting of her recent diagnosis. She denied chest pain or shortness of breath. She denied overt palpitations. She had tremors. No prior cardiac work up. ECG at her visit showed NSR, possible right atrial enlargement. Nonspecific ST abnormality. Echocardiogram was ordered. She and Dr. Tomás Escudero discussed [employer] monitoring, but opted for [facility] monitoring. She was instructed to start a magnesium supplement and increase hydration.     Echocardiogram completed 12/8/2021 showed EF 70% with no wall motion or valvular abnormalities. She followed up 5/19/2022 at which time she had recently started Mucuna pruriens supplementation as recommended by her herbalist. She continued to follow with her neurologist at [facility], Dr. Jennifer Sanders. She had been prescribed Azilect which she had not yet started. She was taking magnesium 250 mg daily which was improving her heart rates. She remained very active, participating in yoga, boxing, ping-pong, and cycling.      7/10/2023: Bharat Lawler presents for routine follow up. She tells me that she is doing well from a cardiac standpoint. She states her heart rates have typically been in the 70's. She did have a small glass of wine the other night and noticed her heart rates were up after drinking that. She took extra magnesium which helped. She also finds extra hydration to be helpful.  She did purchase a [facility] for home ECG monitoring but has not yet started using it. She moved her neurology care to [facility] as her [facility] neurologist retired. She was evaluated by Dr. Jessica Zamora on 5/19/2023 at which time treatment was reviewed and she opted to start an MAO B inhibitor rasagiline. She is also taking Macuna Beans. She does find these are beneficial for mood and dystonia symptoms. She continues to remain very active, which is helpful for her symptoms. She particularly enjoys ping-pong. She completed a lipid panel through her PCP in 2/2023 which showed significantly elevated cholesterol with direct . She states she was eating a high fat diet in attempt to gain weight at that time and has since adjusted her diet. She will be repeating labs in August. She denies chest discomfort, shortness of breath, edema, lightheadedness, BP issues, palpitations.     Medical Problems     Problem List     Tachycardia    Parkinson disease ([address] )        Past Medical History:   Diagnosis Date   • Parkinson's disease ([facility])      Social History     Socioeconomic History   • Marital status: /Civil Union     Spouse name: Not on file   • Number of children: 2   • Years of education: Not on file   • Highest education level: Not on file   Occupational History   • Occupation: chiropractor   Tobacco Use   • Smoking status: Never   • Smokeless tobacco: Current   Vaping Use   • Vaping Use: Never used   Substance and Sexual Activity   • Alcohol use: Not Currently   • Drug use: Never   • Sexual activity: Yes     Partners: Male   Other Topics Concern   • Not on file   Social History Narrative   • Not on file     Social Determinants of Health     Financial Resource Strain: Not on file   Food Insecurity: Not on file   Transportation Needs: Not on file   Physical Activity: Not on file   Stress: Not on file   Social Connections: Not on file   Intimate Partner Violence: Not on file   Housing Stability: Not on file      Family History   Problem Relation Age of Onset   • Atrial fibrillation Mother • Stroke Mother    • Heart failure Father    • Atrial fibrillation Father    • Pulmonary embolism Father    • Hypertension Father    • Hyperlipidemia Father    • Abnormal EKG Brother    • Hypertrophic cardiomyopathy Brother      Past Surgical History:   Procedure Laterality Date   • WISDOM TOOTH EXTRACTION         Current Outpatient Medications:   •  acetaminophen (TYLENOL) 500 mg tablet, Take 1,000 mg by mouth 2-3 times per week, Disp: , Rfl:   •  Cetirizine HCl (ZyrTEC Allergy) 10 MG CAPS, Take 10 mg by mouth, Disp: , Rfl:   •  ibuprofen (MOTRIN) 400 mg tablet, Take 400 mg by mouth 2-3 days per week, Disp: , Rfl:   •  Magnesium 250 MG TABS, Take 250 mg by mouth in the morning, Disp: , Rfl:   •  NON FORMULARY, Mucuna 20 grams daily, Disp: , Rfl:   •  Omega-3 1000 MG CAPS, Take by mouth, Disp: , Rfl:   •  rasagiline (AZILECT) 1 MG, Take 1 mg by mouth daily, Disp: , Rfl:   •  TURMERIC PO, Take 600 mg by mouth in the morning, Disp: , Rfl:   •  Cholecalciferol 125 MCG (5000 UT) capsule, Take 5,000 Units by mouth daily, Disp: , Rfl:   Allergies   Allergen Reactions   • Other Hives, Shortness Of Breath and Itching     Ragweed   • Shellfish Allergy - Food Allergy Other (See Comments)     Family history of allergy   • Azithromycin Other (See Comments)   • Penicillins Other (See Comments)     Cardiac Test Results:  ECG 7/10/2023: Normal sinus rhythm. Possible right atrial enlargement. Rate 74 bpm.    Lipid panel 2/10/2023: C 336. T 48. H 80. L 246, direct .     ECG 11/19/2021: Normal sinus rhythm. Possible right atrial enlargement. Non specific ST abnormality. No old for comparison.      Lipid panel 6/3/2021: C 241. T 71. H 76. L 151. Review of Systems   Constitutional: Negative. HENT: Negative. Cardiovascular: Negative for chest pain, dyspnea on exertion, irregular heartbeat, leg swelling, near-syncope, orthopnea and palpitations. Respiratory: Negative for cough and snoring. Endocrine: Negative. Skin: Negative. Gastrointestinal: Negative. Genitourinary: Negative. Neurological:        Dystonia related to Parkinson's disease   Psychiatric/Behavioral: Negative. Vitals:    07/10/23 1151   BP: 102/82   Pulse: 66   Temp: 98 °F (36.7 °C)   SpO2: 98%     Vitals:    07/10/23 1151   Weight: 47.8 kg (105 lb 6.4 oz)     Height: 5' 4" (162.6 cm)   Body mass index is 18.09 kg/m². Physical Exam  Vitals and nursing note reviewed. Constitutional:       General: She is not in acute distress. Appearance: She is well-developed. She is not diaphoretic. HENT:      Head: Normocephalic and atraumatic. Neck:      Thyroid: No thyromegaly. Vascular: No carotid bruit or JVD. Cardiovascular:      Rate and Rhythm: Normal rate and regular rhythm. No extrasystoles are present. Pulses: Intact distal pulses. Radial pulses are 2+ on the right side and 2+ on the left side. Heart sounds: Normal heart sounds, S1 normal and S2 normal. No murmur heard. Comments: No edema  Pulmonary:      Effort: Pulmonary effort is normal.      Breath sounds: Normal breath sounds. Abdominal:      General: There is no distension. Palpations: Abdomen is soft. Tenderness: There is no abdominal tenderness. Musculoskeletal:         General: Normal range of motion. Cervical back: Normal range of motion and neck supple. Lymphadenopathy:      Cervical: No cervical adenopathy. Skin:     General: Skin is warm and dry. Neurological:      Mental Status: She is alert and oriented to person, place, and time. Cranial Nerves: No cranial nerve deficit. Psychiatric:         Mood and Affect: Mood normal.         Speech: Speech normal.         Behavior: Behavior normal. Behavior is cooperative.          Cognition and Memory: Cognition normal.

## 2024-04-24 ENCOUNTER — OFFICE VISIT (OUTPATIENT)
Dept: CARDIOLOGY CLINIC | Facility: CLINIC | Age: 59
End: 2024-04-24

## 2024-04-24 VITALS
SYSTOLIC BLOOD PRESSURE: 106 MMHG | OXYGEN SATURATION: 99 % | HEART RATE: 66 BPM | WEIGHT: 106.2 LBS | HEIGHT: 64 IN | BODY MASS INDEX: 18.13 KG/M2 | DIASTOLIC BLOOD PRESSURE: 68 MMHG

## 2024-04-24 DIAGNOSIS — R00.0 TACHYCARDIA: Primary | ICD-10-CM

## 2024-04-24 DIAGNOSIS — G20.A1 PARKINSON'S DISEASE, UNSPECIFIED WHETHER DYSKINESIA PRESENT, UNSPECIFIED WHETHER MANIFESTATIONS FLUCTUATE: ICD-10-CM

## 2024-04-24 DIAGNOSIS — E78.2 MIXED HYPERLIPIDEMIA: ICD-10-CM

## 2024-04-24 DIAGNOSIS — Z82.49 FAMILY HISTORY OF ATRIAL FIBRILLATION: ICD-10-CM

## 2024-04-24 PROCEDURE — 99214 OFFICE O/P EST MOD 30 MIN: CPT | Performed by: STUDENT IN AN ORGANIZED HEALTH CARE EDUCATION/TRAINING PROGRAM

## 2024-04-24 NOTE — PROGRESS NOTES
[facility] [place]  [address]  [address]  [facility] [address]  Phone#  [phone]  Fax#  [phone]  [facility] Follow-up Visit             NAME: Rita Alvarado  AGE: 58 y.o. SEX: female   : 1965   MRN: [id number]  Assessment and plan:  1. Tachycardia    2. Mixed hyperlipidemia    3. Family history of atrial fibrillation    4. Parkinson's disease, unspecified whether dyskinesia present, unspecified whether manifestations fluctuate    Previous cardiac owrk up:  -TTE 2021 LFEV 70%, no major valvular pathologies.     -Ms. Alvarado will continue using [employer] when feels palpitations. She will continue keeping herself hydrated, taking magnesium supplements.   -LDL-D 213 (2023), she made diet changes since that (increased fiber intake, decreased fat). She will have repeat labs done by PCP and forward us results for further recs. She is hesitant to start on statin therapy due to history of Parkinson;s disease.   -discussed modification of risk factors for afib such as obesity, alcohol intake, sleep apnea.     RTC 1 year    Chief Complaint   Patient presents with    Follow-up     HPI:  Rita Alvarado is a 58 y.o. female who presents to the cardiology clinic for follow up for the above-listed problems. Previously was evaluated for tahcycardia (see full note by Allie YEE from 7/10/2023). She is doing well. Exercises: hiking, jogging, weight lifting, yoga. She has not used [employer] but denies palpitations.     SocHx: never smoker, occasional alcohol use, denies illicit drug use  Fhx: both father and mother with afib    Review of Systems denies chest pain, dyspnea on exertion, palpitations, leg swelling, orthopnea, paroxysmal exertional dyspnea or syncope.      Past history, family history, social history, current medications, vital signs, recent lab and imaging studies and  prior cardiology studies reviewed independently  on this visit.    Allergies   Allergen Reactions    Other Hives, Shortness Of Breath and Itching     Ragweed    Shellfish Allergy - Food Allergy Other (See Comments)     Family history of allergy    Azithromycin Other (See Comments)    Penicillins Other (See Comments)     Current Outpatient Medications:     acetaminophen (TYLENOL) 500 mg tablet, Take 1,000 mg by mouth 2-3 times per week, Disp: , Rfl:     Cholecalciferol 125 MCG (5000 UT) capsule, Take 5,000 Units by mouth daily, Disp: , Rfl:     ibuprofen (MOTRIN) 400 mg tablet, Take 400 mg by mouth 2-3 days per week, Disp: , Rfl:     Magnesium 250 MG TABS, Take 250 mg by mouth in the morning, Disp: , Rfl:     NON FORMULARY, Mucuna 20 grams daily, Disp: , Rfl:     Omega-3 1000 MG CAPS, Take by mouth, Disp: , Rfl:     rasagiline (AZILECT) 1 MG, Take 1 mg by mouth daily, Disp: , Rfl:     TURMERIC PO, Take 600 mg by mouth in the morning, Disp: , Rfl:     Cetirizine HCl (ZyrTEC Allergy) 10 MG CAPS, Take 10 mg by mouth (Patient not taking: Reported on 4/24/2024), Disp: , Rfl:     Objective:   Vitals:    04/24/24 1127   BP: 106/68   Pulse: 66   SpO2: 99%     Wt Readings from Last 3 Encounters:   04/24/24 48.2 kg (106 lb 3.2 oz)   07/10/23 47.8 kg (105 lb 6.4 oz)   05/19/22 48.1 kg (106 lb)     Pulse Readings from Last 3 Encounters:   04/24/24 66   07/10/23 66   05/19/22 75     BP Readings from Last 3 Encounters:   04/24/24 106/68   07/10/23 102/82   05/19/22 118/66     Physical Exam  General Appearance:    Alert, cooperative, no distress, appears stated age   Head:    atraumatic   Neck:   Supple,  no carotid  bruit or JVD   Lungs:     Clear to auscultation bilaterally, respirations unlabored   Chest wall:    No tenderness or deformity   Heart:    Regular rate and rhythm, S1 and S2 normal, no murmur, rub    or gallop   Abdomen:     Soft, non-tender, no organomegaly   Extremities:   Extremities no cyanosis or edema   Skin:   Skin color, texture, turgor normal, no rashes or  "lesions      Pertinent Laboratory/Diagnostic Studies:    Laboratory studies reviewed personally by Milka Srinivasan DO    BMP:   Lab Results   Component Value Date    SODIUM 140 02/10/2023    K 4.3 02/10/2023     02/10/2023    CO2 29 02/10/2023    BUN 18 02/10/2023    CREATININE 0.83 02/10/2023    GLUC 92 02/10/2023    CALCIUM 9.4 02/10/2023     CBC:No results found for: \"WBC\", \"HGB\", \"HCT\", \"MCV\", \"PLT\"  Lipid Profile:   No results found for: \"HDL\"  No results found for: \"LDLCALC\"  No results found for: \"TRIG\"   Other labs:  Lab Results   Component Value Date    TSH 1.55 02/10/2023     Lab Results   Component Value Date    ALT 26 02/10/2023    AST 18 02/10/2023     Imaging Studies:  Pertinent imaging studies and cardiac studies were independently reviewed on this visit and findings summarized.    Milka Srinivasan DO  Portions of the record may have been created with voice recognition software.  Occasional wrong word or \"sound alike\" substitutions may have occurred due to the inherent limitations of voice recognition software.  Read the chart carefully and recognize, using context, where substitutions have occurred. Please reach out to me directly for any clarifications.   "

## 2025-04-30 ENCOUNTER — OFFICE VISIT (OUTPATIENT)
Dept: CARDIOLOGY CLINIC | Facility: CLINIC | Age: 60
End: 2025-04-30

## 2025-04-30 VITALS
SYSTOLIC BLOOD PRESSURE: 108 MMHG | OXYGEN SATURATION: 97 % | BODY MASS INDEX: 19.12 KG/M2 | WEIGHT: 112 LBS | HEART RATE: 71 BPM | HEIGHT: 64 IN | TEMPERATURE: 97.4 F | DIASTOLIC BLOOD PRESSURE: 64 MMHG

## 2025-04-30 DIAGNOSIS — E56.9 VITAMIN DEFICIENCY: ICD-10-CM

## 2025-04-30 DIAGNOSIS — E78.2 MIXED HYPERLIPIDEMIA: ICD-10-CM

## 2025-04-30 DIAGNOSIS — G20.B2 PARKINSON'S DISEASE WITH DYSKINESIA AND FLUCTUATING MANIFESTATIONS (HCC): ICD-10-CM

## 2025-04-30 DIAGNOSIS — R00.0 TACHYCARDIA: Primary | ICD-10-CM

## 2025-04-30 PROCEDURE — 99214 OFFICE O/P EST MOD 30 MIN: CPT | Performed by: NURSE PRACTITIONER

## 2025-04-30 PROCEDURE — 93000 ELECTROCARDIOGRAM COMPLETE: CPT | Performed by: NURSE PRACTITIONER

## 2025-04-30 RX ORDER — CARBIDOPA AND LEVODOPA 25; 100 MG/1; MG/1
0.3 TABLET, ORALLY DISINTEGRATING ORAL 3 TIMES DAILY
COMMUNITY

## 2025-04-30 NOTE — PROGRESS NOTES
Cardiology Follow Up    Rita Alvarado  1965  [id number]  [facility] [place] [address]  [address]  [facility] [address] [phone]    Rita presents for routine follow up of tachycardia     1. Tachycardia  Assessment & Plan:  Patient presented for evaluation of tachycardia in November 2021.  Subsequently diagnosed with Parkinson's disease   She noted an overall increase in resting heart rate as well as episodes of heart pounding (hard and fast without irregularity).   Concerned about atrial fibrillation given family history of atrial fibrillation and stroke in her mom and dad.   ECG 11/19/2021 showed normal sinus rhythm at 89 bpm.   Echocardiogram 12/8/2021 with EF 70%. No wall motion or valvular abnormalities.  Suspect she is at risk of autonomic dysfunction related to her underlying Parkinson's disease.  She was instructed on increased hydration as well as daily magnesium supplementation with improvement in heart rates. Currently taking magnesium 250 mg daily with good symptom control.  Orders:  -     POCT ECG  2. Mixed hyperlipidemia  Assessment & Plan:  Lipid panel 2/10/2023: C 336. T 48. H 80. L 246. Direct .  Not currently maintained on statin therapy.  She has since made dietary modifications.  We reviewed benefits of dietary modification with avoiding saturated fats, high fiber/flax. Continue regular exercise.  Update lipids now  Orders:  -     Lipid Panel with Direct LDL reflex; Future; Expected date: 07/30/2025  3. Parkinson's disease with dyskinesia and fluctuating manifestations (HCC)  Assessment & Plan:  Diagnosis made by [place] neurologist Dr. Gamez.  Now following with [facility] neurologist Dr. Hernandez.  On Carbidopa-levodopa, rasagiline.  Currently hemodynamically stable  4. Vitamin deficiency  Comments:  B12/6 and vitamin D per patient request  Orders:  -     Vitamin D 25 hydroxy; Future  -     Vitamin B6; Future      ALEXANDRIA Salinas has a past medical history of Parkinson's disease diagnosed 11/2021.     She met in consultation with Dr. Cardenas on 11/19/2021 for evaluation of tachycardia. She self referred due to family history of atrial fibrillation. She reported onset of heart pounding over the past few weeks, which she described as a hard/fast heart beat. She noticed her resting heart rate was increased, which had previously been low. She had been diagnosed with Parkinson's disease a few days prior to the appointment and noticed increased stress overall, especially in the setting of her recent diagnosis. She denied chest pain or shortness of breath. She denied overt palpitations. She had tremors. No prior cardiac work up. ECG at her visit showed NSR, possible right atrial enlargement. Nonspecific ST abnormality. Echocardiogram was ordered. She and Dr. Cardenas discussed ZIO monitoring, but opted for [employer] monitoring. She was instructed to start a magnesium supplement and increase hydration.     Echocardiogram completed 12/8/2021 showed EF 70% with no wall motion or valvular abnormalities.     She completed a lipid panel through her PCP in 2/2023 which showed significantly elevated cholesterol with direct . She states she was eating a high fat diet in attempt to gain weight at that time and has since adjusted her diet.     4/30/2025: Rita presents for yearly follow up. She continues to follow with [facility] neurology. She is now on carbidopa-levodopa. She remains remarkably active. She jogs, swims, bikes, and plays ping-pong regularly. BP is borderline hypotensive, however she denies any syncope/near syncope or positional orthostasis. HR's are controlled on her current regimen. She denies any overt palpitations or heart racing. She is overdue for labs as she has not yet completed a repeat lipid panel since 2023. She is taking red yeast rice and omega 3 supplementation. She is very reluctant for statin therapy. She  requests nutritional blood work as well.     Medical Problems       Problem List       Tachycardia    Parkinson disease (HCC)    Mixed hyperlipidemia        Past Medical History:   Diagnosis Date    Parkinson's disease (HCC)      Social History     Socioeconomic History    Marital status: /Civil Union     Spouse name: Not on file    Number of children: 2    Years of education: Not on file    Highest education level: Not on file   Occupational History    Occupation: chiropractor   Tobacco Use    Smoking status: Never    Smokeless tobacco: Never   Vaping Use    Vaping status: Never Used   Substance and Sexual Activity    Alcohol use: Not Currently    Drug use: Never    Sexual activity: Yes     Partners: Male   Other Topics Concern    Not on file   Social History Narrative    Not on file     Social Drivers of Health     Financial Resource Strain: Not on file   Food Insecurity: Not on file   Transportation Needs: Not on file   Physical Activity: Not on file   Stress: Not on file   Social Connections: Not on file   Intimate Partner Violence: Not on file   Housing Stability: Not on file      Family History   Problem Relation Age of Onset    Atrial fibrillation Mother     Stroke Mother     Heart failure Father     Atrial fibrillation Father     Pulmonary embolism Father     Hypertension Father     Hyperlipidemia Father     Abnormal EKG Brother     Hypertrophic cardiomyopathy Brother      Past Surgical History:   Procedure Laterality Date    WISDOM TOOTH EXTRACTION         Current Outpatient Medications:     acetaminophen (TYLENOL) 500 mg tablet, Take 1,000 mg by mouth 2-3 times per week, Disp: , Rfl:     carbidopa-levodopa (PARCOPA)  mg per disintegrating tablet, Take 0.3 tablets by mouth 3 (three) times a day, Disp: , Rfl:     Cetirizine HCl (ZyrTEC Allergy) 10 MG CAPS, Take 10 mg by mouth, Disp: , Rfl:     Cholecalciferol 125 MCG (5000 UT) capsule, Take 5,000 Units by mouth daily, Disp: , Rfl:     ibuprofen  (MOTRIN) 400 mg tablet, Take 400 mg by mouth 2-3 days per week, Disp: , Rfl:     Magnesium 250 MG TABS, Take 250 mg by mouth in the morning, Disp: , Rfl:     NON FORMULARY, Mucuna 20 grams daily, Disp: , Rfl:     Omega-3 1000 MG CAPS, Take by mouth, Disp: , Rfl:     rasagiline (AZILECT) 1 MG, Take 1 mg by mouth daily, Disp: , Rfl:     TURMERIC PO, Take 600 mg by mouth in the morning, Disp: , Rfl:   Allergies   Allergen Reactions    Other Hives, Shortness Of Breath and Itching     Ragweed    Shellfish Allergy - Food Allergy Other (See Comments)     Family history of allergy    Azithromycin Other (See Comments)    Penicillins Other (See Comments)       Labs:     Chemistry        Component Value Date/Time    K 4.3 02/10/2023 0914     02/10/2023 0914    CO2 29 02/10/2023 0914    BUN 18 02/10/2023 0914    CREATININE 0.83 02/10/2023 0914        Component Value Date/Time    CALCIUM 9.4 02/10/2023 0914    ALKPHOS 78 02/10/2023 0914    AST 18 02/10/2023 0914    ALT 26 02/10/2023 0914        ECG 4/30/2025:  Normal sinus rhythm. Nonspecific ST/T wave abnormality secondary to artifact from patient tremor. Rate 69 bpm.     ECG 7/10/2023: Normal sinus rhythm. Possible right atrial enlargement. Rate 74 bpm.     Lipid panel 2/10/2023: C 336. T 48. H 80. L 246, direct .     ECG 11/19/2021: Normal sinus rhythm. Possible right atrial enlargement. Non specific ST abnormality. No old for comparison.      Lipid panel 6/3/2021: C 241. T 71. H 76. L 151.     Review of Systems   Constitutional: Positive for malaise/fatigue.   HENT: Negative.     Cardiovascular:  Negative for chest pain, dyspnea on exertion, irregular heartbeat, leg swelling, near-syncope, orthopnea and palpitations.   Respiratory:  Negative for cough and snoring.    Endocrine: Negative.    Skin: Negative.    Musculoskeletal:  Positive for back pain.   Gastrointestinal: Negative.    Genitourinary: Negative.    Neurological:  Positive for tremors and weakness.  "  Psychiatric/Behavioral: Negative.         Vitals:    04/30/25 1048   BP: 108/64   Pulse: 71   Temp: (!) 97.4 °F (36.3 °C)   SpO2: 97%     Vitals:    04/30/25 1048   Weight: 50.8 kg (112 lb)     Height: 5' 4\" (162.6 cm)   Body mass index is 19.22 kg/m².    Physical Exam  Vitals and nursing note reviewed.   Constitutional:       General: She is not in acute distress.     Appearance: She is well-developed. She is not diaphoretic.   HENT:      Head: Normocephalic and atraumatic.   Neck:      Thyroid: No thyromegaly.      Vascular: No carotid bruit or JVD.   Cardiovascular:      Rate and Rhythm: Normal rate and regular rhythm.      Pulses: Intact distal pulses.           Radial pulses are 2+ on the right side and 2+ on the left side.      Heart sounds: Normal heart sounds, S1 normal and S2 normal. No murmur heard.  Pulmonary:      Effort: Pulmonary effort is normal.      Breath sounds: Normal breath sounds.   Abdominal:      General: There is no distension.      Palpations: Abdomen is soft.      Tenderness: There is no abdominal tenderness.   Musculoskeletal:         General: Normal range of motion.      Cervical back: Normal range of motion and neck supple.   Lymphadenopathy:      Cervical: No cervical adenopathy.   Skin:     General: Skin is warm and dry.   Neurological:      Mental Status: She is alert and oriented to person, place, and time.      Cranial Nerves: No cranial nerve deficit.      Motor: Weakness and tremor present.   Psychiatric:         Mood and Affect: Mood and affect normal.         Speech: Speech normal.         Behavior: Behavior normal. Behavior is cooperative.         Cognition and Memory: Cognition normal.                "

## 2025-04-30 NOTE — PATIENT INSTRUCTIONS
Report any ongoing issues with heart rate or blood pressure  Consider droxydopa only if needed.  Please complete labs

## 2025-05-01 NOTE — ASSESSMENT & PLAN NOTE
Patient presented for evaluation of tachycardia in November 2021.  Subsequently diagnosed with Parkinson's disease   She noted an overall increase in resting heart rate as well as episodes of heart pounding (hard and fast without irregularity).   Concerned about atrial fibrillation given family history of atrial fibrillation and stroke in her mom and dad.   ECG 11/19/2021 showed normal sinus rhythm at 89 bpm.   Echocardiogram 12/8/2021 with EF 70%. No wall motion or valvular abnormalities.  Suspect she is at risk of autonomic dysfunction related to her underlying Parkinson's disease.  She was instructed on increased hydration as well as daily magnesium supplementation with improvement in heart rates. Currently taking magnesium 250 mg daily with good symptom control.

## 2025-05-01 NOTE — ASSESSMENT & PLAN NOTE
Lipid panel 2/10/2023: C 336. T 48. H 80. L 246. Direct .  Not currently maintained on statin therapy.  She has since made dietary modifications.  We reviewed benefits of dietary modification with avoiding saturated fats, high fiber/flax. Continue regular exercise.  Update lipids now

## 2025-05-01 NOTE — ASSESSMENT & PLAN NOTE
Diagnosis made by [place] neurologist Dr. Gamez.  Now following with [facility] neurologist Dr. Hernandez.  On Carbidopa-levodopa, rasagiline.  Currently hemodynamically stable